# Patient Record
Sex: FEMALE
[De-identification: names, ages, dates, MRNs, and addresses within clinical notes are randomized per-mention and may not be internally consistent; named-entity substitution may affect disease eponyms.]

---

## 2020-06-09 ENCOUNTER — RESULT REVIEW (OUTPATIENT)
Age: 50
End: 2020-06-09

## 2021-08-05 ENCOUNTER — RESULT REVIEW (OUTPATIENT)
Age: 51
End: 2021-08-05

## 2022-02-03 ENCOUNTER — APPOINTMENT (OUTPATIENT)
Dept: PEDIATRIC ALLERGY IMMUNOLOGY | Facility: CLINIC | Age: 52
End: 2022-02-03

## 2022-02-03 PROBLEM — Z00.00 ENCOUNTER FOR PREVENTIVE HEALTH EXAMINATION: Status: ACTIVE | Noted: 2022-02-03

## 2022-02-07 ENCOUNTER — APPOINTMENT (OUTPATIENT)
Dept: PEDIATRIC ALLERGY IMMUNOLOGY | Facility: CLINIC | Age: 52
End: 2022-02-07
Payer: COMMERCIAL

## 2022-02-07 DIAGNOSIS — L50.3 DERMATOGRAPHIC URTICARIA: ICD-10-CM

## 2022-02-07 PROCEDURE — 99203 OFFICE O/P NEW LOW 30 MIN: CPT

## 2022-02-07 NOTE — ASSESSMENT
[FreeTextEntry1] : 51 yr old with dermatographia for the past 5 months - now slowly decreasing in intensity \par Dermatology did blood work - currently pending\par \par Will start Zyrtec 10 mg qd-bid for at least 6-8 weeks and then taper to PRN\par \par Follow up in 6-8 weeks if still symptomatic\par \par Total MD time spent on this encounter was 30 minutes.  This includes time devoted to preparing to see the patient with review of previous medical record, obtaining medical history, performing physical exam, counseling and patient education with patient and family, ordering medications and lab studies, documentation in the medical record and coordination of care.\par \par \par

## 2022-02-07 NOTE — REASON FOR VISIT
[Initial Evaluation] : an initial evaluation of [Allergy Evaluation/ Skin Testing] : allergy evaluation and or skin testing [Hives] : hives

## 2022-02-07 NOTE — HISTORY OF PRESENT ILLNESS
[de-identified] : 51 yr old with new onset dermatographic urticaria since 9/21 - No specific antecedent illness - no recent COVID or COVID vaccines around time of onset\par Pt has seen several dermatologists, diagnosed with dermatographia and begun on Zyrtec 10 mg - now using qod - she feels much better on her on days with decent resolution\par Digital images provided are consistent with dermatographia.  There is otherwise no specific pattern.

## 2022-02-07 NOTE — PHYSICAL EXAM
[Alert] : alert [Well Nourished] : well nourished [No Discharge] : no discharge [Normal TMs] : both tympanic membranes were normal [No Thrush] : no thrush [Boggy Nasal Turbinates] : no boggy and/or pale nasal turbinates [Posterior Pharyngeal Cobblestoning] : no posterior pharyngeal cobblestoning [No Neck Mass] : no neck mass was observed [Normal Rate and Effort] : normal respiratory rhythm and effort [Wheezing] : no wheezing was heard [Normal Rate] : heart rate was normal  [Normal S1, S2] : normal S1 and S2 [Soft] : abdomen soft [Normal Cervical Lymph Nodes] : cervical [de-identified] : Mild dermatographia noted

## 2022-10-20 ENCOUNTER — RESULT REVIEW (OUTPATIENT)
Age: 52
End: 2022-10-20

## 2023-02-16 ENCOUNTER — RX ONLY (RX ONLY)
Age: 53
End: 2023-02-16

## 2023-02-16 ENCOUNTER — OFFICE (OUTPATIENT)
Dept: URBAN - METROPOLITAN AREA CLINIC 27 | Facility: CLINIC | Age: 53
Setting detail: OPHTHALMOLOGY
End: 2023-02-16
Payer: COMMERCIAL

## 2023-02-16 DIAGNOSIS — H25.13: ICD-10-CM

## 2023-02-16 DIAGNOSIS — H01.001: ICD-10-CM

## 2023-02-16 DIAGNOSIS — H16.223: ICD-10-CM

## 2023-02-16 DIAGNOSIS — H40.013: ICD-10-CM

## 2023-02-16 DIAGNOSIS — H01.002: ICD-10-CM

## 2023-02-16 PROCEDURE — 83861 MICROFLUID ANALY TEARS: CPT | Performed by: OPHTHALMOLOGY

## 2023-02-16 PROCEDURE — 92014 COMPRE OPH EXAM EST PT 1/>: CPT | Performed by: OPHTHALMOLOGY

## 2023-02-16 PROCEDURE — 68761 CLOSE TEAR DUCT OPENING: CPT | Performed by: OPHTHALMOLOGY

## 2023-02-16 PROCEDURE — 92250 FUNDUS PHOTOGRAPHY W/I&R: CPT | Performed by: OPHTHALMOLOGY

## 2023-02-16 ASSESSMENT — TONOMETRY
OD_IOP_MMHG: 11
OS_IOP_MMHG: 12

## 2023-02-16 ASSESSMENT — PUNCTA - ASSESSMENT
OD_PUNCTA: 3MON PLUG
OS_PUNCTA: 3MON PLUG

## 2023-02-16 ASSESSMENT — VISUAL ACUITY
OS_BCVA: 20/20-3
OD_BCVA: 20/25

## 2023-02-16 ASSESSMENT — REFRACTION_AUTOREFRACTION
OD_AXIS: 001
OS_AXIS: 004
OS_SPHERE: -1.25
OD_CYLINDER: +2.25
OS_CYLINDER: +2.50
OD_SPHERE: -1.00

## 2023-02-16 ASSESSMENT — REFRACTION_CURRENTRX
OS_SPHERE: +1.25
OS_CYLINDER: -1.50
OD_SPHERE: PLANO
OS_OVR_VA: 20/
OS_AXIS: 091
OD_CYLINDER: -1.75
OS_AXIS: 092
OD_OVR_VA: 20/
OD_AXIS: 089
OD_AXIS: 101
OD_CYLINDER: -1.75
OD_SPHERE: +1.50
OS_OVR_VA: 20/
OD_OVR_VA: 20/
OS_CYLINDER: -1.50
OS_SPHERE: PLANO

## 2023-02-16 ASSESSMENT — AXIALLENGTH_DERIVED
OD_AL: 23.5433
OS_AL: 23.3645

## 2023-02-16 ASSESSMENT — KERATOMETRY
OD_AXISANGLE_DEGREES: 012
METHOD_AUTO_MANUAL: AUTO
OD_K2POWER_DIOPTERS: 44.00
OD_K1POWER_DIOPTERS: 43.00
OS_K1POWER_DIOPTERS: 43.50
OS_K2POWER_DIOPTERS: 44.75
OS_AXISANGLE_DEGREES: 166

## 2023-02-16 ASSESSMENT — CONFRONTATIONAL VISUAL FIELD TEST (CVF)
OD_FINDINGS: FULL
OS_FINDINGS: FULL

## 2023-02-16 ASSESSMENT — LID EXAM ASSESSMENTS
OD_BLEPHARITIS: RLL RUL T
OS_BLEPHARITIS: LLL LUL T

## 2023-02-16 ASSESSMENT — SPHEQUIV_DERIVED
OD_SPHEQUIV: 0.125
OS_SPHEQUIV: 0

## 2023-02-16 ASSESSMENT — DECREASING TEAR LAKE - SEVERITY SCORE
OD_DEC_TEARLAKE: T
OS_DEC_TEARLAKE: T

## 2023-05-18 ENCOUNTER — OFFICE (OUTPATIENT)
Dept: URBAN - METROPOLITAN AREA CLINIC 27 | Facility: CLINIC | Age: 53
Setting detail: OPHTHALMOLOGY
End: 2023-05-18
Payer: COMMERCIAL

## 2023-05-18 DIAGNOSIS — H40.013: ICD-10-CM

## 2023-05-18 DIAGNOSIS — H43.391: ICD-10-CM

## 2023-05-18 DIAGNOSIS — H25.13: ICD-10-CM

## 2023-05-18 DIAGNOSIS — H01.002: ICD-10-CM

## 2023-05-18 DIAGNOSIS — H01.001: ICD-10-CM

## 2023-05-18 DIAGNOSIS — H16.223: ICD-10-CM

## 2023-05-18 PROCEDURE — 83861 MICROFLUID ANALY TEARS: CPT | Performed by: OPHTHALMOLOGY

## 2023-05-18 PROCEDURE — 92014 COMPRE OPH EXAM EST PT 1/>: CPT | Performed by: OPHTHALMOLOGY

## 2023-05-18 PROCEDURE — 92133 CPTRZD OPH DX IMG PST SGM ON: CPT | Performed by: OPHTHALMOLOGY

## 2023-05-18 PROCEDURE — 68761 CLOSE TEAR DUCT OPENING: CPT | Performed by: OPHTHALMOLOGY

## 2023-05-18 PROCEDURE — 92201 OPSCPY EXTND RTA DRAW UNI/BI: CPT | Performed by: OPHTHALMOLOGY

## 2023-05-18 ASSESSMENT — REFRACTION_AUTOREFRACTION
OS_CYLINDER: +2.25
OS_SPHERE: -1.00
OD_AXIS: 177
OD_SPHERE: -1.00
OS_AXIS: 180
OD_CYLINDER: +2.00

## 2023-05-18 ASSESSMENT — PUNCTA - ASSESSMENT
OD_PUNCTA: 3MON PLUG
OS_PUNCTA: 3MON PLUG

## 2023-05-18 ASSESSMENT — REFRACTION_CURRENTRX
OS_OVR_VA: 20/
OS_AXIS: 092
OD_OVR_VA: 20/
OD_AXIS: 101
OS_SPHERE: PLANO
OD_SPHERE: PLANO
OD_AXIS: 089
OS_CYLINDER: -1.50
OS_AXIS: 091
OD_SPHERE: +1.50
OD_CYLINDER: -1.75
OD_OVR_VA: 20/
OS_OVR_VA: 20/
OD_CYLINDER: -1.75
OS_CYLINDER: -1.50
OS_SPHERE: +1.25

## 2023-05-18 ASSESSMENT — VISUAL ACUITY
OD_BCVA: 20/25-2
OS_BCVA: 20/25-2

## 2023-05-18 ASSESSMENT — TONOMETRY
OD_IOP_MMHG: 10
OS_IOP_MMHG: 10

## 2023-05-18 ASSESSMENT — DECREASING TEAR LAKE - SEVERITY SCORE
OS_DEC_TEARLAKE: T
OD_DEC_TEARLAKE: T

## 2023-05-18 ASSESSMENT — LID EXAM ASSESSMENTS
OS_BLEPHARITIS: LLL LUL T
OD_BLEPHARITIS: RLL RUL T

## 2023-05-18 ASSESSMENT — KERATOMETRY
OS_K1POWER_DIOPTERS: 43.25
OD_K1POWER_DIOPTERS: 43.25
OD_K2POWER_DIOPTERS: 44.00
OS_AXISANGLE_DEGREES: 163
METHOD_AUTO_MANUAL: AUTO
OS_K2POWER_DIOPTERS: 44.25
OD_AXISANGLE_DEGREES: 014

## 2023-05-18 ASSESSMENT — SPHEQUIV_DERIVED
OD_SPHEQUIV: 0
OS_SPHEQUIV: 0.125

## 2023-05-18 ASSESSMENT — AXIALLENGTH_DERIVED
OS_AL: 23.4522
OD_AL: 23.5461

## 2023-05-18 ASSESSMENT — CONFRONTATIONAL VISUAL FIELD TEST (CVF)
OD_FINDINGS: FULL
OS_FINDINGS: FULL

## 2023-09-20 ENCOUNTER — OFFICE (OUTPATIENT)
Dept: URBAN - METROPOLITAN AREA CLINIC 102 | Facility: CLINIC | Age: 53
Setting detail: OPHTHALMOLOGY
End: 2023-09-20
Payer: COMMERCIAL

## 2023-09-20 DIAGNOSIS — H01.002: ICD-10-CM

## 2023-09-20 DIAGNOSIS — H16.222: ICD-10-CM

## 2023-09-20 DIAGNOSIS — H16.223: ICD-10-CM

## 2023-09-20 DIAGNOSIS — H01.005: ICD-10-CM

## 2023-09-20 DIAGNOSIS — H01.004: ICD-10-CM

## 2023-09-20 DIAGNOSIS — H40.011: ICD-10-CM

## 2023-09-20 DIAGNOSIS — H40.013: ICD-10-CM

## 2023-09-20 DIAGNOSIS — H01.001: ICD-10-CM

## 2023-09-20 DIAGNOSIS — H25.13: ICD-10-CM

## 2023-09-20 DIAGNOSIS — H40.012: ICD-10-CM

## 2023-09-20 DIAGNOSIS — H43.391: ICD-10-CM

## 2023-09-20 PROCEDURE — 68761 CLOSE TEAR DUCT OPENING: CPT | Performed by: OPHTHALMOLOGY

## 2023-09-20 PROCEDURE — 99213 OFFICE O/P EST LOW 20 MIN: CPT | Performed by: OPHTHALMOLOGY

## 2023-09-20 PROCEDURE — 83861 MICROFLUID ANALY TEARS: CPT | Performed by: OPHTHALMOLOGY

## 2023-09-20 ASSESSMENT — REFRACTION_CURRENTRX
OS_SPHERE: +1.25
OS_AXIS: 091
OD_CYLINDER: -1.75
OD_AXIS: 089
OS_OVR_VA: 20/
OD_OVR_VA: 20/
OS_AXIS: 092
OD_CYLINDER: -1.75
OD_AXIS: 101
OD_OVR_VA: 20/
OS_OVR_VA: 20/
OD_SPHERE: PLANO
OS_SPHERE: PLANO
OD_SPHERE: +1.50
OS_CYLINDER: -1.50
OS_CYLINDER: -1.50

## 2023-09-20 ASSESSMENT — REFRACTION_AUTOREFRACTION
OD_SPHERE: +1.00
OD_CYLINDER: -2.00
OS_AXIS: 092
OS_CYLINDER: -2.25
OD_AXIS: 090
OS_SPHERE: +1.00

## 2023-09-20 ASSESSMENT — SPHEQUIV_DERIVED
OD_SPHEQUIV: 0
OS_SPHEQUIV: -0.125

## 2023-09-20 ASSESSMENT — AXIALLENGTH_DERIVED
OS_AL: 23.4577
OD_AL: 23.455

## 2023-09-20 ASSESSMENT — LID EXAM ASSESSMENTS
OS_BLEPHARITIS: LLL LUL T
OD_BLEPHARITIS: RLL RUL T

## 2023-09-20 ASSESSMENT — KERATOMETRY
OD_K1POWER_DIOPTERS: 43.50
OD_K2POWER_DIOPTERS: 44.25
OS_AXISANGLE_DEGREES: 156
METHOD_AUTO_MANUAL: AUTO
OD_AXISANGLE_DEGREES: 016
OS_K1POWER_DIOPTERS: 43.50
OS_K2POWER_DIOPTERS: 44.50

## 2023-09-20 ASSESSMENT — TONOMETRY
OS_IOP_MMHG: 10
OD_IOP_MMHG: 10

## 2023-09-20 ASSESSMENT — PUNCTA - ASSESSMENT
OS_PUNCTA: 3MON PLUG
OD_PUNCTA: 3MON PLUG

## 2023-09-20 ASSESSMENT — CONFRONTATIONAL VISUAL FIELD TEST (CVF)
OD_FINDINGS: FULL
OS_FINDINGS: FULL

## 2023-09-20 ASSESSMENT — DECREASING TEAR LAKE - SEVERITY SCORE
OS_DEC_TEARLAKE: T
OD_DEC_TEARLAKE: T

## 2023-09-20 ASSESSMENT — VISUAL ACUITY
OD_BCVA: 20/30-1
OS_BCVA: 20/25

## 2023-12-13 ENCOUNTER — OFFICE (OUTPATIENT)
Dept: URBAN - METROPOLITAN AREA CLINIC 102 | Facility: CLINIC | Age: 53
Setting detail: OPHTHALMOLOGY
End: 2023-12-13
Payer: COMMERCIAL

## 2023-12-13 DIAGNOSIS — H43.391: ICD-10-CM

## 2023-12-13 DIAGNOSIS — H40.012: ICD-10-CM

## 2023-12-13 DIAGNOSIS — H25.13: ICD-10-CM

## 2023-12-13 DIAGNOSIS — H00.12: ICD-10-CM

## 2023-12-13 DIAGNOSIS — H01.001: ICD-10-CM

## 2023-12-13 DIAGNOSIS — H01.005: ICD-10-CM

## 2023-12-13 DIAGNOSIS — H01.004: ICD-10-CM

## 2023-12-13 DIAGNOSIS — H40.013: ICD-10-CM

## 2023-12-13 DIAGNOSIS — H40.011: ICD-10-CM

## 2023-12-13 DIAGNOSIS — H16.223: ICD-10-CM

## 2023-12-13 DIAGNOSIS — H01.002: ICD-10-CM

## 2023-12-13 PROCEDURE — 83861 MICROFLUID ANALY TEARS: CPT | Mod: QW | Performed by: OPHTHALMOLOGY

## 2023-12-13 PROCEDURE — 92014 COMPRE OPH EXAM EST PT 1/>: CPT | Mod: 25 | Performed by: OPHTHALMOLOGY

## 2023-12-13 PROCEDURE — 68761 CLOSE TEAR DUCT OPENING: CPT | Mod: 50 | Performed by: OPHTHALMOLOGY

## 2023-12-13 PROCEDURE — 92020 GONIOSCOPY: CPT | Performed by: OPHTHALMOLOGY

## 2023-12-13 ASSESSMENT — REFRACTION_CURRENTRX
OS_CYLINDER: -1.50
OS_AXIS: 091
OS_AXIS: 092
OD_SPHERE: +1.50
OD_OVR_VA: 20/
OD_AXIS: 089
OS_OVR_VA: 20/
OD_OVR_VA: 20/
OS_CYLINDER: -1.50
OS_OVR_VA: 20/
OS_SPHERE: PLANO
OD_SPHERE: PLANO
OD_CYLINDER: -1.75
OD_AXIS: 101
OD_CYLINDER: -1.75
OS_SPHERE: +1.25

## 2023-12-13 ASSESSMENT — PUNCTA - ASSESSMENT
OD_PUNCTA: 3MON PLUG
OS_PUNCTA: 3MON PLUG

## 2023-12-13 ASSESSMENT — LID EXAM ASSESSMENTS
OD_BLEPHARITIS: RLL RUL T
OS_BLEPHARITIS: LLL LUL T

## 2023-12-13 ASSESSMENT — REFRACTION_AUTOREFRACTION
OD_SPHERE: +1.00
OS_CYLINDER: -2.25
OD_CYLINDER: -2.00
OD_AXIS: 088
OS_SPHERE: +1.00
OS_AXIS: 093

## 2023-12-13 ASSESSMENT — CONFRONTATIONAL VISUAL FIELD TEST (CVF)
OD_FINDINGS: FULL
OS_FINDINGS: FULL

## 2023-12-13 ASSESSMENT — SPHEQUIV_DERIVED
OS_SPHEQUIV: -0.125
OD_SPHEQUIV: 0

## 2023-12-13 ASSESSMENT — DECREASING TEAR LAKE - SEVERITY SCORE
OS_DEC_TEARLAKE: T
OD_DEC_TEARLAKE: T

## 2024-01-04 ENCOUNTER — OFFICE (OUTPATIENT)
Dept: URBAN - METROPOLITAN AREA CLINIC 27 | Facility: CLINIC | Age: 54
Setting detail: OPHTHALMOLOGY
End: 2024-01-04
Payer: COMMERCIAL

## 2024-01-04 DIAGNOSIS — H01.002: ICD-10-CM

## 2024-01-04 DIAGNOSIS — H01.004: ICD-10-CM

## 2024-01-04 DIAGNOSIS — H16.223: ICD-10-CM

## 2024-01-04 DIAGNOSIS — H01.001: ICD-10-CM

## 2024-01-04 PROCEDURE — 99213 OFFICE O/P EST LOW 20 MIN: CPT | Performed by: OPHTHALMOLOGY

## 2024-01-04 PROCEDURE — 83861 MICROFLUID ANALY TEARS: CPT | Mod: QW | Performed by: OPHTHALMOLOGY

## 2024-01-04 ASSESSMENT — REFRACTION_CURRENTRX
OD_OVR_VA: 20/
OS_CYLINDER: -1.50
OS_OVR_VA: 20/
OD_CYLINDER: -1.75
OD_AXIS: 089
OS_OVR_VA: 20/
OS_AXIS: 092
OS_AXIS: 091
OS_SPHERE: PLANO
OD_CYLINDER: -1.75
OS_CYLINDER: -1.50
OD_OVR_VA: 20/
OD_SPHERE: +1.50
OD_AXIS: 101
OD_SPHERE: PLANO
OS_SPHERE: +1.25

## 2024-01-04 ASSESSMENT — REFRACTION_AUTOREFRACTION
OS_AXIS: 3
OS_SPHERE: -1.25
OS_CYLINDER: +2.25
OD_CYLINDER: +2.25
OD_SPHERE: -1.25
OD_AXIS: 175

## 2024-01-04 ASSESSMENT — DECREASING TEAR LAKE - SEVERITY SCORE
OS_DEC_TEARLAKE: T
OD_DEC_TEARLAKE: T

## 2024-01-04 ASSESSMENT — CONFRONTATIONAL VISUAL FIELD TEST (CVF)
OD_FINDINGS: FULL
OS_FINDINGS: FULL

## 2024-01-04 ASSESSMENT — SPHEQUIV_DERIVED
OD_SPHEQUIV: -0.125
OS_SPHEQUIV: -0.125

## 2024-01-04 ASSESSMENT — LID EXAM ASSESSMENTS
OD_BLEPHARITIS: RLL RUL T
OS_BLEPHARITIS: LLL LUL T

## 2024-01-04 ASSESSMENT — PUNCTA - ASSESSMENT
OS_PUNCTA: 3MON PLUG
OD_PUNCTA: 3MON PLUG

## 2024-05-15 ENCOUNTER — OFFICE (OUTPATIENT)
Dept: URBAN - METROPOLITAN AREA CLINIC 102 | Facility: CLINIC | Age: 54
Setting detail: OPHTHALMOLOGY
End: 2024-05-15
Payer: COMMERCIAL

## 2024-05-15 DIAGNOSIS — H01.004: ICD-10-CM

## 2024-05-15 DIAGNOSIS — H01.001: ICD-10-CM

## 2024-05-15 DIAGNOSIS — H16.223: ICD-10-CM

## 2024-05-15 DIAGNOSIS — H01.002: ICD-10-CM

## 2024-05-15 DIAGNOSIS — H01.005: ICD-10-CM

## 2024-05-15 PROCEDURE — 99213 OFFICE O/P EST LOW 20 MIN: CPT | Mod: 25 | Performed by: OPHTHALMOLOGY

## 2024-05-15 PROCEDURE — 83861 MICROFLUID ANALY TEARS: CPT | Mod: QW | Performed by: OPHTHALMOLOGY

## 2024-05-15 PROCEDURE — 68761 CLOSE TEAR DUCT OPENING: CPT | Mod: 50 | Performed by: OPHTHALMOLOGY

## 2024-05-15 ASSESSMENT — CONFRONTATIONAL VISUAL FIELD TEST (CVF)
OD_FINDINGS: FULL
OS_FINDINGS: FULL

## 2024-05-15 ASSESSMENT — LID EXAM ASSESSMENTS
OS_BLEPHARITIS: LLL LUL T
OD_BLEPHARITIS: RLL RUL T

## 2024-09-25 ENCOUNTER — OFFICE (OUTPATIENT)
Dept: URBAN - METROPOLITAN AREA CLINIC 102 | Facility: CLINIC | Age: 54
Setting detail: OPHTHALMOLOGY
End: 2024-09-25
Payer: COMMERCIAL

## 2024-09-25 DIAGNOSIS — H01.00A: ICD-10-CM

## 2024-09-25 DIAGNOSIS — H40.013: ICD-10-CM

## 2024-09-25 DIAGNOSIS — H16.223: ICD-10-CM

## 2024-09-25 DIAGNOSIS — H43.391: ICD-10-CM

## 2024-09-25 DIAGNOSIS — H01.00B: ICD-10-CM

## 2024-09-25 PROCEDURE — 83861 MICROFLUID ANALY TEARS: CPT | Mod: QW | Performed by: OPHTHALMOLOGY

## 2024-09-25 PROCEDURE — 68761 CLOSE TEAR DUCT OPENING: CPT | Mod: 50 | Performed by: OPHTHALMOLOGY

## 2024-09-25 PROCEDURE — 92014 COMPRE OPH EXAM EST PT 1/>: CPT | Mod: 25 | Performed by: OPHTHALMOLOGY

## 2024-09-25 ASSESSMENT — LID EXAM ASSESSMENTS
OD_MEIBOMITIS: RLL RUL T
OS_MEIBOMITIS: LLL LUL T
OS_BLEPHARITIS: LLL LUL T
OD_BLEPHARITIS: RLL RUL T

## 2024-09-25 ASSESSMENT — CONFRONTATIONAL VISUAL FIELD TEST (CVF)
OS_FINDINGS: FULL
OD_FINDINGS: FULL

## 2024-10-29 ENCOUNTER — APPOINTMENT (OUTPATIENT)
Dept: ORTHOPEDIC SURGERY | Facility: CLINIC | Age: 54
End: 2024-10-29
Payer: COMMERCIAL

## 2024-10-29 VITALS — HEIGHT: 61 IN | WEIGHT: 120 LBS | BODY MASS INDEX: 22.66 KG/M2

## 2024-10-29 DIAGNOSIS — S90.31XA CONTUSION OF RIGHT FOOT, INITIAL ENCOUNTER: ICD-10-CM

## 2024-10-29 DIAGNOSIS — Z78.9 OTHER SPECIFIED HEALTH STATUS: ICD-10-CM

## 2024-10-29 PROCEDURE — 99203 OFFICE O/P NEW LOW 30 MIN: CPT

## 2024-10-29 PROCEDURE — 73630 X-RAY EXAM OF FOOT: CPT | Mod: RT

## 2024-11-05 ENCOUNTER — APPOINTMENT (OUTPATIENT)
Dept: ORTHOPEDIC SURGERY | Facility: CLINIC | Age: 54
End: 2024-11-05

## 2025-01-15 ENCOUNTER — OFFICE (OUTPATIENT)
Dept: URBAN - METROPOLITAN AREA CLINIC 102 | Facility: CLINIC | Age: 55
Setting detail: OPHTHALMOLOGY
End: 2025-01-15
Payer: COMMERCIAL

## 2025-01-15 DIAGNOSIS — H01.00A: ICD-10-CM

## 2025-01-15 DIAGNOSIS — H01.00B: ICD-10-CM

## 2025-01-15 DIAGNOSIS — H43.391: ICD-10-CM

## 2025-01-15 DIAGNOSIS — H16.223: ICD-10-CM

## 2025-01-15 DIAGNOSIS — H40.013: ICD-10-CM

## 2025-01-15 PROCEDURE — 99213 OFFICE O/P EST LOW 20 MIN: CPT | Mod: 25 | Performed by: OPHTHALMOLOGY

## 2025-01-15 PROCEDURE — 68761 CLOSE TEAR DUCT OPENING: CPT | Mod: 50 | Performed by: OPHTHALMOLOGY

## 2025-01-15 PROCEDURE — 92133 CPTRZD OPH DX IMG PST SGM ON: CPT | Performed by: OPHTHALMOLOGY

## 2025-01-15 PROCEDURE — 83861 MICROFLUID ANALY TEARS: CPT | Mod: QW | Performed by: OPHTHALMOLOGY

## 2025-01-15 PROCEDURE — 92020 GONIOSCOPY: CPT | Performed by: OPHTHALMOLOGY

## 2025-01-15 ASSESSMENT — REFRACTION_AUTOREFRACTION
OD_AXIS: 091
OD_CYLINDER: -2.25
OD_SPHERE: +1.50
OS_AXIS: 092
OS_SPHERE: +1.25
OS_CYLINDER: -2.25

## 2025-01-15 ASSESSMENT — REFRACTION_CURRENTRX
OS_SPHERE: PLANO
OS_SPHERE: +1.25
OD_SPHERE: PLANO
OS_OVR_VA: 20/
OD_AXIS: 101
OD_CYLINDER: -1.75
OS_OVR_VA: 20/
OS_AXIS: 092
OD_AXIS: 089
OD_OVR_VA: 20/
OS_AXIS: 091
OD_SPHERE: +1.50
OD_CYLINDER: -1.75
OS_CYLINDER: -1.50
OD_OVR_VA: 20/
OS_CYLINDER: -1.50

## 2025-01-15 ASSESSMENT — LID EXAM ASSESSMENTS
OD_MEIBOMITIS: RLL RUL T
OS_BLEPHARITIS: LLL LUL T
OD_BLEPHARITIS: RLL RUL T
OS_MEIBOMITIS: LLL LUL T

## 2025-01-15 ASSESSMENT — DECREASING TEAR LAKE - SEVERITY SCORE
OS_DEC_TEARLAKE: T
OD_DEC_TEARLAKE: T

## 2025-01-15 ASSESSMENT — VISUAL ACUITY
OD_BCVA: 20/20
OS_BCVA: 20/20

## 2025-01-15 ASSESSMENT — PUNCTA - ASSESSMENT
OS_PUNCTA: 3MON PLUG
OD_PUNCTA: 3MON PLUG

## 2025-01-15 ASSESSMENT — KERATOMETRY
OS_K2POWER_DIOPTERS: 44.75
OD_AXISANGLE_DEGREES: 010
METHOD_AUTO_MANUAL: AUTO
OD_K2POWER_DIOPTERS: 44.25
OS_AXISANGLE_DEGREES: 166
OD_K1POWER_DIOPTERS: 43.50
OS_K1POWER_DIOPTERS: 43.25

## 2025-01-15 ASSESSMENT — CONFRONTATIONAL VISUAL FIELD TEST (CVF)
OD_FINDINGS: FULL
OS_FINDINGS: FULL

## 2025-03-19 ENCOUNTER — NON-APPOINTMENT (OUTPATIENT)
Age: 55
End: 2025-03-19

## 2025-03-29 ENCOUNTER — NON-APPOINTMENT (OUTPATIENT)
Age: 55
End: 2025-03-29

## 2025-04-16 ENCOUNTER — OFFICE (OUTPATIENT)
Dept: URBAN - METROPOLITAN AREA CLINIC 102 | Facility: CLINIC | Age: 55
Setting detail: OPHTHALMOLOGY
End: 2025-04-16
Payer: COMMERCIAL

## 2025-04-16 DIAGNOSIS — H01.002: ICD-10-CM

## 2025-04-16 DIAGNOSIS — H01.004: ICD-10-CM

## 2025-04-16 DIAGNOSIS — H16.223: ICD-10-CM

## 2025-04-16 DIAGNOSIS — H40.013: ICD-10-CM

## 2025-04-16 DIAGNOSIS — H01.001: ICD-10-CM

## 2025-04-16 PROCEDURE — 68761 CLOSE TEAR DUCT OPENING: CPT | Mod: 50 | Performed by: OPHTHALMOLOGY

## 2025-04-16 PROCEDURE — 92012 INTRM OPH EXAM EST PATIENT: CPT | Mod: 25 | Performed by: OPHTHALMOLOGY

## 2025-04-16 PROCEDURE — 83861 MICROFLUID ANALY TEARS: CPT | Mod: QW | Performed by: OPHTHALMOLOGY

## 2025-04-16 ASSESSMENT — REFRACTION_CURRENTRX
OS_AXIS: 092
OD_SPHERE: PLANO
OD_CYLINDER: -1.75
OD_AXIS: 101
OD_OVR_VA: 20/
OS_CYLINDER: -1.50
OD_SPHERE: +1.50
OS_OVR_VA: 20/
OS_AXIS: 091
OS_SPHERE: +1.25
OS_OVR_VA: 20/
OD_AXIS: 089
OS_CYLINDER: -1.50
OS_SPHERE: PLANO
OD_CYLINDER: -1.75
OD_OVR_VA: 20/

## 2025-04-16 ASSESSMENT — TONOMETRY
OD_IOP_MMHG: 11
OS_IOP_MMHG: 11

## 2025-04-16 ASSESSMENT — KERATOMETRY
OS_AXISANGLE_DEGREES: 157
OD_K1POWER_DIOPTERS: 43.50
OD_AXISANGLE_DEGREES: 010
METHOD_AUTO_MANUAL: AUTO
OS_K2POWER_DIOPTERS: 44.50
OD_K2POWER_DIOPTERS: 44.50
OS_K1POWER_DIOPTERS: 43.25

## 2025-04-16 ASSESSMENT — VISUAL ACUITY
OS_BCVA: 20/25
OD_BCVA: 20/20

## 2025-04-16 ASSESSMENT — CONFRONTATIONAL VISUAL FIELD TEST (CVF)
OS_FINDINGS: FULL
OD_FINDINGS: FULL

## 2025-04-16 ASSESSMENT — REFRACTION_AUTOREFRACTION
OS_SPHERE: +1.25
OD_CYLINDER: -2.00
OD_SPHERE: +1.00
OS_CYLINDER: -2.50
OD_AXIS: 082
OS_AXIS: 085

## 2025-04-16 ASSESSMENT — DECREASING TEAR LAKE - SEVERITY SCORE
OS_DEC_TEARLAKE: T
OD_DEC_TEARLAKE: T

## 2025-04-16 ASSESSMENT — PUNCTA - ASSESSMENT
OD_PUNCTA: 3MON PLUG
OS_PUNCTA: 3MON PLUG

## 2025-04-16 ASSESSMENT — LID EXAM ASSESSMENTS
OS_MEIBOMITIS: LLL LUL T
OS_BLEPHARITIS: LLL LUL T
OD_BLEPHARITIS: RLL RUL T
OD_MEIBOMITIS: RLL RUL T

## 2025-06-07 ENCOUNTER — NON-APPOINTMENT (OUTPATIENT)
Age: 55
End: 2025-06-07